# Patient Record
Sex: FEMALE | URBAN - METROPOLITAN AREA
[De-identification: names, ages, dates, MRNs, and addresses within clinical notes are randomized per-mention and may not be internally consistent; named-entity substitution may affect disease eponyms.]

---

## 2018-06-20 ENCOUNTER — OFFICE VISIT (OUTPATIENT)
Dept: URBAN - METROPOLITAN AREA CLINIC 76 | Facility: CLINIC | Age: 38
End: 2018-06-20
Payer: COMMERCIAL

## 2018-06-20 PROCEDURE — 92012 INTRM OPH EXAM EST PATIENT: CPT | Performed by: OPTOMETRIST

## 2018-06-20 PROCEDURE — 92015 DETERMINE REFRACTIVE STATE: CPT | Performed by: OPTOMETRIST

## 2018-06-20 ASSESSMENT — VISUAL ACUITY
OD: 20/20
OS: 20/20

## 2018-06-20 ASSESSMENT — KERATOMETRY
OS: 44.00
OD: 44.13

## 2018-06-20 ASSESSMENT — INTRAOCULAR PRESSURE
OD: 17
OS: 17

## 2018-06-20 NOTE — IMPRESSION/PLAN
Impression: Myopia, bilateral: H52.13. Pt declined DE. Plan: A glasses and contact lens prescription has been discussed and generated. Give contact lens trials to the patient. The patient should try and approve them. No contact lens follow-up is needed unless the patient has concerns with the final prescription. Discussed importance of DE. Every 2 yrs okay with no new changes.

## 2019-08-14 ENCOUNTER — OFFICE VISIT (OUTPATIENT)
Dept: URBAN - METROPOLITAN AREA CLINIC 76 | Facility: CLINIC | Age: 39
End: 2019-08-14
Payer: COMMERCIAL

## 2019-08-14 PROCEDURE — 92310 CONTACT LENS FITTING OU: CPT | Performed by: OPTOMETRIST

## 2019-08-14 PROCEDURE — 92014 COMPRE OPH EXAM EST PT 1/>: CPT | Performed by: OPTOMETRIST

## 2019-08-14 ASSESSMENT — VISUAL ACUITY
OS: 20/20
OD: 20/20

## 2019-08-14 ASSESSMENT — KERATOMETRY
OS: 44.13
OD: 44.25

## 2019-08-14 ASSESSMENT — INTRAOCULAR PRESSURE
OD: 18
OS: 17

## 2019-08-14 NOTE — IMPRESSION/PLAN
Impression: Myopia, bilateral: H52.13. mild progression. 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 Plan: A glasses prescription has been discussed and generated. A contact lens prescription has also been discussed and generated. Trials first. Pt to approve. Patient to call with any concerns. Discussed presbyopia. May need to use readers over CLs.

## 2020-10-14 ENCOUNTER — OFFICE VISIT (OUTPATIENT)
Dept: URBAN - METROPOLITAN AREA CLINIC 76 | Facility: CLINIC | Age: 40
End: 2020-10-14
Payer: COMMERCIAL

## 2020-10-14 PROCEDURE — 92014 COMPRE OPH EXAM EST PT 1/>: CPT | Performed by: OPTOMETRIST

## 2020-10-14 PROCEDURE — 92310 CONTACT LENS FITTING OU: CPT | Performed by: OPTOMETRIST

## 2020-10-14 ASSESSMENT — KERATOMETRY
OS: 44.25
OD: 44.13

## 2020-10-14 ASSESSMENT — INTRAOCULAR PRESSURE
OS: 23
OD: 22

## 2020-10-14 ASSESSMENT — VISUAL ACUITY
OD: 20/20
OS: 20/20

## 2020-10-14 NOTE — IMPRESSION/PLAN
Impression: Myopia, bilateral: H52.13. Bilateral. Plan: A glasses prescription has been discussed and generated. A contact lens prescription has also been discussed and generated. Patient to call with any concerns.

## 2020-12-30 ENCOUNTER — OFFICE VISIT (OUTPATIENT)
Dept: URBAN - METROPOLITAN AREA CLINIC 76 | Facility: CLINIC | Age: 40
End: 2020-12-30
Payer: COMMERCIAL

## 2020-12-30 PROCEDURE — 99213 OFFICE O/P EST LOW 20 MIN: CPT | Performed by: OPTOMETRIST

## 2020-12-30 ASSESSMENT — INTRAOCULAR PRESSURE
OD: 17
OS: 17

## 2020-12-30 NOTE — IMPRESSION/PLAN
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 Mild. Plan: A. Discussed chronic condition, will take time to improve. Advise the use of Ketotifen/Olopatadine BID OU before and after ctl wear, try to avoid wearing as much as possible. May continue oral OTC antihistamine. Call if symptoms do not resolve or if worsens.

## 2021-02-18 ENCOUNTER — OFFICE VISIT (OUTPATIENT)
Dept: URBAN - METROPOLITAN AREA CLINIC 76 | Facility: CLINIC | Age: 41
End: 2021-02-18
Payer: COMMERCIAL

## 2021-02-18 DIAGNOSIS — H10.413 CHRONIC GIANT PAPILLARY CONJUNCTIVITIS, BILATERAL: Primary | ICD-10-CM

## 2021-02-18 DIAGNOSIS — H52.13 MYOPIA, BILATERAL: ICD-10-CM

## 2021-02-18 PROCEDURE — 99212 OFFICE O/P EST SF 10 MIN: CPT | Performed by: OPTOMETRIST

## 2021-02-18 ASSESSMENT — INTRAOCULAR PRESSURE
OD: 15
OS: 15

## 2021-02-18 NOTE — IMPRESSION/PLAN
Impression: Myopia, bilateral: H52.13. Bilateral. Pt would like to switch to daily contacts. Plan: Discussed. Dispense trials. Pt will need 1 wk ctls follow up after disp.

## 2021-02-18 NOTE — IMPRESSION/PLAN
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 Mild. Plan: Re-discussed chronic condition, will take time to improve. Continue [place] BID OU before and after ctl wear, try to avoid wearing as much as possible. May continue oral OTC antihistamine. Call if symptoms do not resolve or if worsens.

## 2021-03-10 ENCOUNTER — TESTING ONLY (OUTPATIENT)
Dept: URBAN - METROPOLITAN AREA CLINIC 76 | Facility: CLINIC | Age: 41
End: 2021-03-10

## 2021-03-10 PROCEDURE — 92310 CONTACT LENS FITTING OU: CPT | Performed by: OPTOMETRIST

## 2021-03-10 NOTE — IMPRESSION/PLAN
Impression: Myopia, bilateral: H52.13. Bilateral. Pt happy with jesse. Plan: Discussed. Finalized ctl Rx.

## 2022-02-17 ENCOUNTER — OFFICE VISIT (OUTPATIENT)
Dept: URBAN - METROPOLITAN AREA CLINIC 76 | Facility: CLINIC | Age: 42
End: 2022-02-17
Payer: COMMERCIAL

## 2022-02-17 PROCEDURE — 92310 CONTACT LENS FITTING OU: CPT | Performed by: OPTOMETRIST

## 2022-02-17 PROCEDURE — 92014 COMPRE OPH EXAM EST PT 1/>: CPT | Performed by: OPTOMETRIST

## 2022-02-17 ASSESSMENT — VISUAL ACUITY
OD: 20/20
OS: 20/20

## 2022-02-17 ASSESSMENT — KERATOMETRY
OD: 44.13
OS: 44.25

## 2022-02-17 ASSESSMENT — INTRAOCULAR PRESSURE
OS: 16
OD: 17

## 2022-02-17 NOTE — IMPRESSION/PLAN
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 Mild. Plan: Re-discussed chronic condition, will take time to improve. Warning for wearing ctl during allergy flare up. Continue [place] BID OU before and after ctl wear during allergy season. May continue oral OTC antihistamine. Call if symptoms do not resolve or if worsens.

## 2022-02-17 NOTE — IMPRESSION/PLAN
Impression: Myopia, bilateral: H52.13. Bilateral. Pt happy with jesse. Plan: A glasses and contact lens prescription has been discussed and generated. Give contact lens trials to the patient. The patient should try and approve them. No contact lens follow-up is needed unless the patient has concerns with the final prescription.

## 2023-04-13 ENCOUNTER — OFFICE VISIT (OUTPATIENT)
Dept: URBAN - METROPOLITAN AREA CLINIC 76 | Facility: CLINIC | Age: 43
End: 2023-04-13
Payer: COMMERCIAL

## 2023-04-13 DIAGNOSIS — H52.13 MYOPIA, BILATERAL: Primary | ICD-10-CM

## 2023-04-13 DIAGNOSIS — H35.413 LATTICE DEGENERATION OF RETINA, BILATERAL: ICD-10-CM

## 2023-04-13 PROCEDURE — 92310 CONTACT LENS FITTING OU: CPT | Performed by: OPTOMETRIST

## 2023-04-13 PROCEDURE — 92012 INTRM OPH EXAM EST PATIENT: CPT | Performed by: OPTOMETRIST

## 2023-04-13 ASSESSMENT — INTRAOCULAR PRESSURE
OD: 19
OS: 17

## 2023-04-13 ASSESSMENT — KERATOMETRY
OD: 44.00
OS: 44.25

## 2023-04-13 ASSESSMENT — VISUAL ACUITY
OS: 20/20
OD: 20/20

## 2023-04-13 NOTE — IMPRESSION/PLAN
Impression: Lattice degeneration of retina, bilateral: H35.413. pt declined dilation. Plan: Discussed this condition. Explained importance of DE. Pt may return soon for DE when able/willing. Reviewed s/s of RD.

## 2023-05-24 ENCOUNTER — OFFICE VISIT (OUTPATIENT)
Dept: URBAN - METROPOLITAN AREA CLINIC 76 | Facility: CLINIC | Age: 43
End: 2023-05-24
Payer: COMMERCIAL

## 2023-05-24 DIAGNOSIS — H35.413 LATTICE DEGENERATION OF RETINA, BILATERAL: Primary | ICD-10-CM

## 2023-05-24 PROCEDURE — 92014 COMPRE OPH EXAM EST PT 1/>: CPT | Performed by: OPTOMETRIST

## 2023-05-24 ASSESSMENT — INTRAOCULAR PRESSURE
OD: 15
OS: 17

## 2023-05-24 ASSESSMENT — KERATOMETRY
OD: 44.13
OS: 44.25

## 2023-05-24 NOTE — IMPRESSION/PLAN
Impression: Lattice degeneration of retina, bilateral: H35.413. Plan: Discussed condition in great detail. Reviewed s/s of RD. Pt to call with any symptoms noted or any concerns.

## 2024-04-23 ENCOUNTER — OFFICE VISIT (OUTPATIENT)
Dept: URBAN - METROPOLITAN AREA CLINIC 76 | Facility: CLINIC | Age: 44
End: 2024-04-23
Payer: COMMERCIAL

## 2024-04-23 DIAGNOSIS — H52.13 MYOPIA, BILATERAL: Primary | ICD-10-CM

## 2024-04-23 DIAGNOSIS — H35.413 LATTICE DEGENERATION OF RETINA, BILATERAL: ICD-10-CM

## 2024-04-23 PROCEDURE — 92014 COMPRE OPH EXAM EST PT 1/>: CPT | Performed by: OPTOMETRIST

## 2024-04-23 PROCEDURE — 92310 CONTACT LENS FITTING OU: CPT | Performed by: OPTOMETRIST

## 2024-04-23 ASSESSMENT — INTRAOCULAR PRESSURE
OD: 18
OS: 18

## 2024-04-23 ASSESSMENT — VISUAL ACUITY
OS: 20/20
OD: 20/20

## 2024-04-23 ASSESSMENT — KERATOMETRY
OS: 44.25
OD: 44.50